# Patient Record
Sex: FEMALE | Race: WHITE | NOT HISPANIC OR LATINO
[De-identification: names, ages, dates, MRNs, and addresses within clinical notes are randomized per-mention and may not be internally consistent; named-entity substitution may affect disease eponyms.]

---

## 2018-05-21 ENCOUNTER — RESULT REVIEW (OUTPATIENT)
Age: 68
End: 2018-05-21

## 2019-05-21 ENCOUNTER — RESULT REVIEW (OUTPATIENT)
Age: 69
End: 2019-05-21

## 2021-04-28 ENCOUNTER — APPOINTMENT (OUTPATIENT)
Dept: PULMONOLOGY | Facility: CLINIC | Age: 71
End: 2021-04-28
Payer: COMMERCIAL

## 2021-05-05 ENCOUNTER — APPOINTMENT (OUTPATIENT)
Dept: PULMONOLOGY | Facility: CLINIC | Age: 71
End: 2021-05-05
Payer: COMMERCIAL

## 2021-05-05 VITALS
DIASTOLIC BLOOD PRESSURE: 86 MMHG | BODY MASS INDEX: 45.29 KG/M2 | HEART RATE: 76 BPM | WEIGHT: 243 LBS | SYSTOLIC BLOOD PRESSURE: 130 MMHG | HEIGHT: 61.25 IN | OXYGEN SATURATION: 99 %

## 2021-05-05 DIAGNOSIS — Z85.3 PERSONAL HISTORY OF MALIGNANT NEOPLASM OF BREAST: ICD-10-CM

## 2021-05-05 DIAGNOSIS — Z86.79 PERSONAL HISTORY OF OTHER DISEASES OF THE CIRCULATORY SYSTEM: ICD-10-CM

## 2021-05-05 DIAGNOSIS — Z87.891 PERSONAL HISTORY OF NICOTINE DEPENDENCE: ICD-10-CM

## 2021-05-05 DIAGNOSIS — Z92.21 PERSONAL HISTORY OF ANTINEOPLASTIC CHEMOTHERAPY: ICD-10-CM

## 2021-05-05 DIAGNOSIS — E11.9 TYPE 2 DIABETES MELLITUS W/OUT COMPLICATIONS: ICD-10-CM

## 2021-05-05 DIAGNOSIS — R60.0 LOCALIZED EDEMA: ICD-10-CM

## 2021-05-05 DIAGNOSIS — Z80.3 FAMILY HISTORY OF MALIGNANT NEOPLASM OF BREAST: ICD-10-CM

## 2021-05-05 DIAGNOSIS — Z92.3 PERSONAL HISTORY OF IRRADIATION: ICD-10-CM

## 2021-05-05 DIAGNOSIS — Z83.3 FAMILY HISTORY OF DIABETES MELLITUS: ICD-10-CM

## 2021-05-05 PROCEDURE — 99204 OFFICE O/P NEW MOD 45 MIN: CPT

## 2021-05-05 PROCEDURE — 99072 ADDL SUPL MATRL&STAF TM PHE: CPT

## 2021-05-05 RX ORDER — OXYCODONE HYDROCHLORIDE AND ACETAMINOPHEN 10; 325 MG/1; MG/1
10-325 TABLET ORAL
Refills: 0 | Status: ACTIVE | COMMUNITY

## 2021-05-05 RX ORDER — LETROZOLE TABLETS 2.5 MG/1
2.5 TABLET, FILM COATED ORAL
Refills: 0 | Status: ACTIVE | COMMUNITY

## 2021-05-05 RX ORDER — OLMESARTAN MEDOXOMIL 20 MG/1
20 TABLET, FILM COATED ORAL
Refills: 0 | Status: ACTIVE | COMMUNITY

## 2021-05-05 RX ORDER — METFORMIN HYDROCHLORIDE 500 MG/1
500 TABLET, COATED ORAL
Refills: 0 | Status: ACTIVE | COMMUNITY

## 2021-05-05 RX ORDER — LEVOTHYROXINE SODIUM 100 UG/1
100 TABLET ORAL
Refills: 0 | Status: ACTIVE | COMMUNITY

## 2021-05-05 RX ORDER — ALPRAZOLAM 0.25 MG/1
0.25 TABLET ORAL
Refills: 0 | Status: ACTIVE | COMMUNITY

## 2021-05-05 NOTE — REASON FOR VISIT
[Consultation] : a consultation [Edema] : edema [Shortness of Breath] : shortness of breath [Wheezing] : wheezing [Obesity] : obesity

## 2021-05-05 NOTE — REVIEW OF SYSTEMS
[Recent Wt Gain (___ Lbs)] : ~T recent [unfilled] lb weight gain [Dyspnea] : dyspnea [Wheezing] : wheezing [SOB on Exertion] : sob on exertion [Edema] : edema [Diabetes] : diabetes [Negative] : Psychiatric

## 2021-05-05 NOTE — CONSULT LETTER
[Dear  ___] : Dear  [unfilled], [Consult Letter:] : I had the pleasure of evaluating your patient, [unfilled]. [Please see my note below.] : Please see my note below. [Consult Closing:] : Thank you very much for allowing me to participate in the care of this patient.  If you have any questions, please do not hesitate to contact me. [Sincerely,] : Sincerely, [FreeTextEntry3] : Alcides Cao MD\par  [DrPetey  ___] : Dr. MCINTOSH

## 2021-05-05 NOTE — PHYSICAL EXAM
[No Acute Distress] : no acute distress [Normal Oropharynx] : normal oropharynx [Normal Appearance] : normal appearance [No Neck Mass] : no neck mass [Normal Rate/Rhythm] : normal rate/rhythm [Normal S1, S2] : normal s1, s2 [No Murmurs] : no murmurs [No Resp Distress] : no resp distress [Clear to Auscultation Bilaterally] : clear to auscultation bilaterally [No Abnormalities] : no abnormalities [Benign] : benign [Normal Gait] : normal gait [No Clubbing] : no clubbing [No Cyanosis] : no cyanosis [FROM] : FROM [Normal Color/ Pigmentation] : normal color/ pigmentation [No Focal Deficits] : no focal deficits [Oriented x3] : oriented x3 [Normal Affect] : normal affect [TextBox_105] : Trace edema

## 2021-05-06 ENCOUNTER — APPOINTMENT (OUTPATIENT)
Dept: RADIOLOGY | Facility: CLINIC | Age: 71
End: 2021-05-06
Payer: MEDICARE

## 2021-05-06 ENCOUNTER — OUTPATIENT (OUTPATIENT)
Dept: OUTPATIENT SERVICES | Facility: HOSPITAL | Age: 71
LOS: 1 days | End: 2021-05-06
Payer: MEDICARE

## 2021-05-06 DIAGNOSIS — Z98.89 OTHER SPECIFIED POSTPROCEDURAL STATES: Chronic | ICD-10-CM

## 2021-05-06 DIAGNOSIS — R06.00 DYSPNEA, UNSPECIFIED: ICD-10-CM

## 2021-05-06 DIAGNOSIS — Z90.49 ACQUIRED ABSENCE OF OTHER SPECIFIED PARTS OF DIGESTIVE TRACT: Chronic | ICD-10-CM

## 2021-05-06 DIAGNOSIS — Z98.84 BARIATRIC SURGERY STATUS: Chronic | ICD-10-CM

## 2021-05-06 PROCEDURE — 71046 X-RAY EXAM CHEST 2 VIEWS: CPT | Mod: 26

## 2021-05-06 PROCEDURE — 71046 X-RAY EXAM CHEST 2 VIEWS: CPT

## 2021-05-22 ENCOUNTER — APPOINTMENT (OUTPATIENT)
Dept: DISASTER EMERGENCY | Facility: CLINIC | Age: 71
End: 2021-05-22

## 2021-05-22 ENCOUNTER — LABORATORY RESULT (OUTPATIENT)
Age: 71
End: 2021-05-22

## 2021-05-25 ENCOUNTER — APPOINTMENT (OUTPATIENT)
Dept: PULMONOLOGY | Facility: CLINIC | Age: 71
End: 2021-05-25
Payer: MEDICARE

## 2021-05-25 VITALS
SYSTOLIC BLOOD PRESSURE: 130 MMHG | OXYGEN SATURATION: 98 % | WEIGHT: 244 LBS | HEIGHT: 61.25 IN | DIASTOLIC BLOOD PRESSURE: 80 MMHG | BODY MASS INDEX: 45.48 KG/M2 | TEMPERATURE: 97.7 F | HEART RATE: 74 BPM

## 2021-05-25 DIAGNOSIS — R06.00 DYSPNEA, UNSPECIFIED: ICD-10-CM

## 2021-05-25 DIAGNOSIS — E66.01 MORBID (SEVERE) OBESITY DUE TO EXCESS CALORIES: ICD-10-CM

## 2021-05-25 DIAGNOSIS — R06.02 SHORTNESS OF BREATH: ICD-10-CM

## 2021-05-25 PROCEDURE — 94727 GAS DIL/WSHOT DETER LNG VOL: CPT

## 2021-05-25 PROCEDURE — 99213 OFFICE O/P EST LOW 20 MIN: CPT | Mod: 25

## 2021-05-25 PROCEDURE — 85018 HEMOGLOBIN: CPT | Mod: QW

## 2021-05-25 PROCEDURE — 94729 DIFFUSING CAPACITY: CPT

## 2021-05-25 PROCEDURE — 94010 BREATHING CAPACITY TEST: CPT

## 2021-05-25 NOTE — CONSULT LETTER
[Dear  ___] : Dear  [unfilled], [Courtesy Letter:] : I had the pleasure of seeing your patient, [unfilled], in my office today. [Please see my note below.] : Please see my note below. [Consult Closing:] : Thank you very much for allowing me to participate in the care of this patient.  If you have any questions, please do not hesitate to contact me. [Sincerely,] : Sincerely, [FreeTextEntry3] : Alcides Cao MD\par

## 2021-05-25 NOTE — COUNSELING
[Potential consequences of obesity discussed] : Potential consequences of obesity discussed [Benefits of weight loss discussed] : Benefits of weight loss discussed [Structured Weight Management Program suggested:] : Structured weight management program suggested [Encouraged to maintain food diary] : Encouraged to maintain food diary [FreeTextEntry1] : Weight [employer]chers>

## 2022-09-01 ENCOUNTER — APPOINTMENT (OUTPATIENT)
Dept: ORTHOPEDIC SURGERY | Facility: CLINIC | Age: 72
End: 2022-09-01

## 2022-09-01 DIAGNOSIS — M18.12 UNILATERAL PRIMARY OSTEOARTHRITIS OF FIRST CARPOMETACARPAL JOINT, LEFT HAND: ICD-10-CM

## 2022-09-01 DIAGNOSIS — M65.4 RADIAL STYLOID TENOSYNOVITIS [DE QUERVAIN]: ICD-10-CM

## 2022-09-01 PROCEDURE — 73110 X-RAY EXAM OF WRIST: CPT | Mod: LT

## 2022-09-01 PROCEDURE — 99203 OFFICE O/P NEW LOW 30 MIN: CPT | Mod: 25

## 2022-09-01 PROCEDURE — 20605 DRAIN/INJ JOINT/BURSA W/O US: CPT

## 2022-09-01 NOTE — HISTORY OF PRESENT ILLNESS
[Sudden] : sudden [5] : 5 [Constant] : constant [Household chores] : household chores [Leisure] : leisure [Rest] : rest [Retired] : Work status: retired [de-identified] : 72 year old female presenting with radial sided wrist and thumb pain. No injury/trauma.  [] : no [FreeTextEntry3] : 08/2022 [FreeTextEntry5] : n/a [FreeTextEntry6] : pressure

## 2022-09-01 NOTE — PHYSICAL EXAM
[First Dorsal Compartment] : first dorsal compartment [1st] : 1st [CMC Joint] : CMC joint [Left] : left wrist [Degenerative change] : Degenerative change [] : no erythema [FreeTextEntry8] : stage 3 first CMC arthritis

## 2022-10-03 ENCOUNTER — APPOINTMENT (OUTPATIENT)
Dept: ORTHOPEDIC SURGERY | Facility: CLINIC | Age: 72
End: 2022-10-03

## 2023-03-02 ENCOUNTER — OFFICE (OUTPATIENT)
Dept: URBAN - METROPOLITAN AREA CLINIC 115 | Facility: CLINIC | Age: 73
Setting detail: OPHTHALMOLOGY
End: 2023-03-02
Payer: MEDICARE

## 2023-03-02 DIAGNOSIS — E11.9: ICD-10-CM

## 2023-03-02 DIAGNOSIS — H35.40: ICD-10-CM

## 2023-03-02 DIAGNOSIS — H43.813: ICD-10-CM

## 2023-03-02 DIAGNOSIS — H35.033: ICD-10-CM

## 2023-03-02 DIAGNOSIS — H25.13: ICD-10-CM

## 2023-03-02 DIAGNOSIS — H40.003: ICD-10-CM

## 2023-03-02 PROCEDURE — 92083 EXTENDED VISUAL FIELD XM: CPT | Performed by: OPTOMETRIST

## 2023-03-02 PROCEDURE — 92014 COMPRE OPH EXAM EST PT 1/>: CPT | Performed by: OPTOMETRIST

## 2023-03-02 PROCEDURE — 92133 CPTRZD OPH DX IMG PST SGM ON: CPT | Performed by: OPTOMETRIST

## 2023-03-02 ASSESSMENT — REFRACTION_MANIFEST
OD_VA1: 20/30
OS_VA1: 20/30
OS_CYLINDER: -0.50
OD_SPHERE: -5.75
OD_ADD: +3.00
OS_SPHERE: -5.25
OS_ADD: +3.00
OS_AXIS: 120

## 2023-03-02 ASSESSMENT — REFRACTION_CURRENTRX
OD_VPRISM_DIRECTION: PROGS
OD_CYLINDER: -1.00
OS_VPRISM_DIRECTION: PROGS
OD_AXIS: 128
OS_SPHERE: -5.75
OD_ADD: +2.50
OD_OVR_VA: 20/
OS_OVR_VA: 20/
OS_CYLINDER: -0.75
OD_ADD: +2.50
OS_CYLINDER: SPH
OS_OVR_VA: 20/
OD_VPRISM_DIRECTION: PROGS
OS_OVR_VA: 20/
OD_VPRISM_DIRECTION: PROGS
OD_SPHERE: -5.75
OS_SPHERE: -5.75
OS_VPRISM_DIRECTION: PROGS
OS_CYLINDER: -0.75
OD_AXIS: 118
OD_ADD: +2.75
OD_OVR_VA: 20/
OD_SPHERE: -5.50
OD_CYLINDER: -0.25
OS_ADD: +2.50
OD_SPHERE: -5.50
OD_OVR_VA: 20/
OS_SPHERE: -5.50
OS_ADD: +2.75
OS_AXIS: 089
OS_ADD: +2.50
OS_VPRISM_DIRECTION: PROGS
OD_AXIS: 116
OS_AXIS: 093
OD_CYLINDER: -1.00

## 2023-03-02 ASSESSMENT — REFRACTION_AUTOREFRACTION
OS_AXIS: 085
OS_CYLINDER: -0.75
OD_SPHERE: -6.00
OD_CYLINDER: -1.00
OS_SPHERE: -5.75
OD_AXIS: 137

## 2023-03-02 ASSESSMENT — PACHYMETRY
OD_CT_CORRECTION: -1
OS_CT_CORRECTION: -1
OS_CT_UM: 560
OD_CT_UM: 560

## 2023-03-02 ASSESSMENT — SPHEQUIV_DERIVED
OS_SPHEQUIV: -5.5
OD_SPHEQUIV: -6.5
OS_SPHEQUIV: -6.125

## 2023-03-02 ASSESSMENT — VISUAL ACUITY
OS_BCVA: 20/50
OD_BCVA: 20/60

## 2023-03-02 ASSESSMENT — CONFRONTATIONAL VISUAL FIELD TEST (CVF)
OS_FINDINGS: FULL
OD_FINDINGS: FULL

## 2023-04-11 ENCOUNTER — OFFICE (OUTPATIENT)
Dept: URBAN - METROPOLITAN AREA CLINIC 115 | Facility: CLINIC | Age: 73
Setting detail: OPHTHALMOLOGY
End: 2023-04-11
Payer: MEDICARE

## 2023-04-11 DIAGNOSIS — H40.003: ICD-10-CM

## 2023-04-11 DIAGNOSIS — H25.12: ICD-10-CM

## 2023-04-11 DIAGNOSIS — H43.813: ICD-10-CM

## 2023-04-11 DIAGNOSIS — H25.13: ICD-10-CM

## 2023-04-11 DIAGNOSIS — E11.9: ICD-10-CM

## 2023-04-11 DIAGNOSIS — H35.40: ICD-10-CM

## 2023-04-11 DIAGNOSIS — H35.033: ICD-10-CM

## 2023-04-11 PROBLEM — H25.11 CATARACT SENILE NUCLEAR SCLEROSIS; RIGHT EYE, LEFT EYE, BOTH EYES: Status: ACTIVE | Noted: 2023-04-11

## 2023-04-11 PROCEDURE — 99214 OFFICE O/P EST MOD 30 MIN: CPT | Performed by: OPHTHALMOLOGY

## 2023-04-11 PROCEDURE — 92136 OPHTHALMIC BIOMETRY: CPT | Performed by: OPHTHALMOLOGY

## 2023-04-11 ASSESSMENT — REFRACTION_CURRENTRX
OD_VPRISM_DIRECTION: PROGS
OS_ADD: +2.50
OD_ADD: +2.50
OD_OVR_VA: 20/
OS_AXIS: 098
OS_ADD: +2.50
OD_VPRISM_DIRECTION: PROGS
OS_OVR_VA: 20/
OS_VPRISM_DIRECTION: PROGS
OS_OVR_VA: 20/
OS_CYLINDER: -0.75
OS_OVR_VA: 20/
OD_CYLINDER: -0.25
OD_CYLINDER: -1.00
OD_AXIS: 128
OD_SPHERE: -5.50
OD_ADD: +2.75
OD_SPHERE: -5.75
OS_SPHERE: -5.75
OS_ADD: +2.75
OD_SPHERE: -5.50
OD_ADD: +2.50
OD_CYLINDER: -1.25
OD_AXIS: 118
OD_CYLINDER: -1.00
OS_VPRISM_DIRECTION: PROGS
OD_AXIS: 122
OD_VPRISM_DIRECTION: BF
OS_AXIS: 089
OS_CYLINDER: SPH
OS_SPHERE: -5.50
OD_VPRISM_DIRECTION: PROGS
OS_ADD: +3.00
OD_OVR_VA: 20/
OS_SPHERE: -5.75
OS_CYLINDER: -0.75
OS_VPRISM_DIRECTION: BF
OD_OVR_VA: 20/
OD_SPHERE: -5.75
OD_ADD: +3.00
OD_AXIS: 116
OS_VPRISM_DIRECTION: PROGS
OS_SPHERE: -5.75
OS_AXIS: 093
OS_CYLINDER: -0.75

## 2023-04-11 ASSESSMENT — SPHEQUIV_DERIVED
OS_SPHEQUIV: -5.75
OS_SPHEQUIV: -5.5
OD_SPHEQUIV: -6.625

## 2023-04-11 ASSESSMENT — REFRACTION_MANIFEST
OD_ADD: +3.00
OS_SPHERE: -5.25
OD_SPHERE: -5.75
OS_ADD: +3.00
OU_VA: 20/NI
OS_CYLINDER: -0.50
OD_VA1: 20/NI
OS_AXIS: 120
OS_VA1: 20/NI

## 2023-04-11 ASSESSMENT — REFRACTION_AUTOREFRACTION
OS_CYLINDER: -1.00
OD_AXIS: 129
OD_SPHERE: -6.25
OS_SPHERE: -5.25
OS_AXIS: 083
OD_CYLINDER: -0.75

## 2023-04-11 ASSESSMENT — VISUAL ACUITY
OS_BCVA: 20/30-1
OD_BCVA: 20/40

## 2023-04-11 ASSESSMENT — PACHYMETRY
OD_CT_UM: 560
OS_CT_CORRECTION: -1
OD_CT_CORRECTION: -1
OS_CT_UM: 560

## 2023-04-11 ASSESSMENT — TONOMETRY
OS_IOP_MMHG: 16
OD_IOP_MMHG: 16

## 2023-04-11 ASSESSMENT — CONFRONTATIONAL VISUAL FIELD TEST (CVF)
OD_FINDINGS: FULL
OS_FINDINGS: FULL

## 2023-06-08 ENCOUNTER — ASC (OUTPATIENT)
Dept: URBAN - METROPOLITAN AREA SURGERY 8 | Facility: SURGERY | Age: 73
Setting detail: OPHTHALMOLOGY
End: 2023-06-08
Payer: MEDICARE

## 2023-06-08 DIAGNOSIS — H25.12: ICD-10-CM

## 2023-06-08 PROCEDURE — 66984 XCAPSL CTRC RMVL W/O ECP: CPT | Performed by: OPHTHALMOLOGY

## 2023-06-09 ENCOUNTER — RX ONLY (RX ONLY)
Age: 73
End: 2023-06-09

## 2023-06-09 ENCOUNTER — OFFICE (OUTPATIENT)
Dept: URBAN - METROPOLITAN AREA CLINIC 115 | Facility: CLINIC | Age: 73
Setting detail: OPHTHALMOLOGY
End: 2023-06-09
Payer: MEDICARE

## 2023-06-09 DIAGNOSIS — H25.11: ICD-10-CM

## 2023-06-09 DIAGNOSIS — Z96.1: ICD-10-CM

## 2023-06-09 PROCEDURE — 99024 POSTOP FOLLOW-UP VISIT: CPT | Performed by: OPHTHALMOLOGY

## 2023-06-09 PROCEDURE — 92136 OPHTHALMIC BIOMETRY: CPT | Performed by: OPHTHALMOLOGY

## 2023-06-09 ASSESSMENT — REFRACTION_AUTOREFRACTION
OD_CYLINDER: -1.00
OS_CYLINDER: -1.25
OS_SPHERE: -0.75
OD_AXIS: 113
OD_SPHERE: -5.25
OS_AXIS: 084

## 2023-06-09 ASSESSMENT — REFRACTION_MANIFEST
OS_SPHERE: -5.25
OD_VA1: 20/NI
OS_AXIS: 120
OS_VA1: 20/NI
OU_VA: 20/NI
OD_SPHERE: -5.75
OS_ADD: +3.00
OS_CYLINDER: -0.50
OD_ADD: +3.00

## 2023-06-09 ASSESSMENT — REFRACTION_CURRENTRX
OS_SPHERE: -5.75
OS_OVR_VA: 20/
OS_VPRISM_DIRECTION: PROGS
OD_AXIS: 118
OD_ADD: +3.00
OD_CYLINDER: -0.25
OD_AXIS: 122
OD_ADD: +2.50
OD_SPHERE: -5.50
OS_CYLINDER: -0.75
OD_VPRISM_DIRECTION: PROGS
OS_ADD: +2.75
OS_AXIS: 093
OS_OVR_VA: 20/
OD_VPRISM_DIRECTION: PROGS
OS_AXIS: 089
OD_ADD: +2.75
OS_ADD: +2.50
OS_CYLINDER: -0.75
OD_SPHERE: -5.75
OS_OVR_VA: 20/
OS_SPHERE: -5.50
OS_ADD: +2.50
OD_CYLINDER: -1.00
OS_SPHERE: -5.75
OD_CYLINDER: -1.25
OD_AXIS: 128
OD_OVR_VA: 20/
OD_VPRISM_DIRECTION: BF
OS_VPRISM_DIRECTION: PROGS
OS_CYLINDER: SPH
OS_VPRISM_DIRECTION: PROGS
OS_AXIS: 098
OD_VPRISM_DIRECTION: PROGS
OS_SPHERE: -5.75
OD_OVR_VA: 20/
OD_OVR_VA: 20/
OS_CYLINDER: -0.75
OD_AXIS: 116
OD_CYLINDER: -1.00
OS_ADD: +3.00
OD_SPHERE: -5.50
OS_VPRISM_DIRECTION: BF
OD_SPHERE: -5.75
OD_ADD: +2.50

## 2023-06-09 ASSESSMENT — SPHEQUIV_DERIVED
OD_SPHEQUIV: -5.75
OS_SPHEQUIV: -5.5
OS_SPHEQUIV: -1.375

## 2023-06-09 ASSESSMENT — VISUAL ACUITY
OD_BCVA: 20/80
OS_BCVA: 20/40

## 2023-06-09 ASSESSMENT — PACHYMETRY
OD_CT_UM: 560
OS_CT_UM: 560
OS_CT_CORRECTION: -1
OD_CT_CORRECTION: -1

## 2023-06-09 ASSESSMENT — CONFRONTATIONAL VISUAL FIELD TEST (CVF)
OS_FINDINGS: FULL
OD_FINDINGS: FULL

## 2023-06-09 ASSESSMENT — TONOMETRY: OD_IOP_MMHG: 19

## 2023-06-09 ASSESSMENT — CORNEAL EDEMA CLINICAL DESCRIPTION: OS_CORNEALEDEMA: T

## 2023-06-09 ASSESSMENT — CORNEAL EDEMA - FOLDS/STRIAE: OS_FOLDSSTRIAE: T 1+

## 2023-06-22 ENCOUNTER — ASC (OUTPATIENT)
Dept: URBAN - METROPOLITAN AREA SURGERY 8 | Facility: SURGERY | Age: 73
Setting detail: OPHTHALMOLOGY
End: 2023-06-22
Payer: MEDICARE

## 2023-06-22 DIAGNOSIS — H25.11: ICD-10-CM

## 2023-06-22 PROCEDURE — 66984 XCAPSL CTRC RMVL W/O ECP: CPT | Performed by: OPHTHALMOLOGY

## 2023-06-23 ENCOUNTER — OFFICE (OUTPATIENT)
Dept: URBAN - METROPOLITAN AREA CLINIC 115 | Facility: CLINIC | Age: 73
Setting detail: OPHTHALMOLOGY
End: 2023-06-23
Payer: MEDICARE

## 2023-06-23 ENCOUNTER — RX ONLY (RX ONLY)
Age: 73
End: 2023-06-23

## 2023-06-23 DIAGNOSIS — Z96.1: ICD-10-CM

## 2023-06-23 PROCEDURE — 99024 POSTOP FOLLOW-UP VISIT: CPT | Performed by: OPTOMETRIST

## 2023-06-23 ASSESSMENT — PACHYMETRY
OD_CT_UM: 560
OS_CT_CORRECTION: -1
OD_CT_CORRECTION: -1
OS_CT_UM: 560

## 2023-06-23 ASSESSMENT — CORNEAL EDEMA - FOLDS/STRIAE
OD_FOLDSSTRIAE: T
OS_FOLDSSTRIAE: T

## 2023-06-23 ASSESSMENT — CORNEAL EDEMA CLINICAL DESCRIPTION: OS_CORNEALEDEMA: ABSENT

## 2023-06-29 ENCOUNTER — OFFICE (OUTPATIENT)
Dept: URBAN - METROPOLITAN AREA CLINIC 115 | Facility: CLINIC | Age: 73
Setting detail: OPHTHALMOLOGY
End: 2023-06-29
Payer: MEDICARE

## 2023-06-29 DIAGNOSIS — Z96.1: ICD-10-CM

## 2023-06-29 PROBLEM — H25.11 CATARACT SENILE NUCLEAR SCLEROSIS; RIGHT EYE,: Status: RESOLVED | Noted: 2023-06-09 | Resolved: 2023-06-29

## 2023-06-29 PROCEDURE — 99024 POSTOP FOLLOW-UP VISIT: CPT | Performed by: OPTOMETRIST

## 2023-06-29 ASSESSMENT — REFRACTION_CURRENTRX
OD_CYLINDER: -1.25
OS_ADD: +2.75
OS_VPRISM_DIRECTION: PROGS
OD_SPHERE: -5.75
OD_VPRISM_DIRECTION: PROGS
OD_AXIS: 118
OS_VPRISM_DIRECTION: BF
OS_AXIS: 089
OD_CYLINDER: -1.00
OS_CYLINDER: -0.75
OD_AXIS: 118
OS_AXIS: 093
OD_OVR_VA: 20/
OS_OVR_VA: 20/
OD_OVR_VA: 20/
OD_ADD: +3.00
OD_VPRISM_DIRECTION: PROGS
OS_OVR_VA: 20/
OS_ADD: +2.50
OD_AXIS: 128
OD_AXIS: 116
OS_VPRISM_DIRECTION: PROGS
OS_OVR_VA: 20/
OS_AXIS: 089
OS_VPRISM_DIRECTION: PROGS
OS_ADD: +3.00
OS_ADD: +2.50
OS_CYLINDER: SPH
OD_ADD: +2.50
OD_SPHERE: -5.75
OD_OVR_VA: 20/
OS_CYLINDER: -0.75
OD_AXIS: 116
OD_ADD: +2.50
OD_OVR_VA: 20/
OD_VPRISM_DIRECTION: BF
OS_VPRISM_DIRECTION: PROGS
OS_CYLINDER: -0.75
OD_ADD: +2.50
OD_VPRISM_DIRECTION: PROGS
OS_VPRISM_DIRECTION: PROGS
OD_AXIS: 128
OS_CYLINDER: -0.75
OS_SPHERE: -5.75
OS_CYLINDER: -0.75
OD_ADD: +3.00
OS_ADD: +2.75
OD_AXIS: 122
OS_SPHERE: -5.75
OD_SPHERE: -5.50
OD_ADD: +2.75
OD_SPHERE: -5.50
OD_CYLINDER: -1.00
OD_ADD: +2.50
OD_SPHERE: -5.75
OD_CYLINDER: -1.00
OS_AXIS: 098
OS_SPHERE: -5.50
OS_ADD: +2.50
OS_ADD: +2.50
OS_ADD: +3.00
OD_SPHERE: -5.50
OS_OVR_VA: 20/
OD_VPRISM_DIRECTION: PROGS
OS_CYLINDER: SPH
OS_AXIS: 098
OD_VPRISM_DIRECTION: BF
OD_ADD: +2.75
OS_SPHERE: -5.75
OD_CYLINDER: -0.25
OD_VPRISM_DIRECTION: PROGS
OS_SPHERE: -5.50
OD_VPRISM_DIRECTION: PROGS
OD_OVR_VA: 20/
OD_CYLINDER: -0.25
OD_AXIS: 122
OD_SPHERE: -5.75
OS_OVR_VA: 20/
OS_OVR_VA: 20/
OS_SPHERE: -5.75
OS_VPRISM_DIRECTION: BF
OS_AXIS: 093
OS_SPHERE: -5.75
OD_SPHERE: -5.50
OD_CYLINDER: -1.00
OS_VPRISM_DIRECTION: PROGS
OS_CYLINDER: -0.75
OD_CYLINDER: -1.25
OS_SPHERE: -5.75
OD_OVR_VA: 20/

## 2023-06-29 ASSESSMENT — REFRACTION_MANIFEST
OS_SPHERE: -5.25
OD_SPHERE: -5.75
OS_SPHERE: -5.25
OS_ADD: +3.00
OU_VA: 20/NI
OD_ADD: +3.00
OS_AXIS: 120
OU_VA: 20/NI
OS_VA1: 20/NI
OS_CYLINDER: -0.50
OS_CYLINDER: -0.50
OD_VA1: 20/NI
OD_ADD: +3.00
OD_SPHERE: -5.75
OS_AXIS: 120
OS_VA1: 20/NI
OS_ADD: +3.00
OD_VA1: 20/NI

## 2023-06-29 ASSESSMENT — REFRACTION_AUTOREFRACTION
OS_SPHERE: +0.25
OD_AXIS: 144
OD_AXIS: 144
OD_SPHERE: +0.25
OS_CYLINDER: -0.75
OS_AXIS: 070
OS_SPHERE: +0.25
OD_CYLINDER: -0.75
OD_SPHERE: +0.25
OS_CYLINDER: -0.75
OS_AXIS: 070
OD_CYLINDER: -0.75

## 2023-06-29 ASSESSMENT — PACHYMETRY
OD_CT_UM: 560
OD_CT_CORRECTION: -1
OS_CT_CORRECTION: -1
OS_CT_UM: 560

## 2023-06-29 ASSESSMENT — VISUAL ACUITY
OS_BCVA: 20/30-2
OS_BCVA: 20/40-1
OD_BCVA: 20/25
OD_BCVA: 20/25

## 2023-06-29 ASSESSMENT — SPHEQUIV_DERIVED
OS_SPHEQUIV: -0.125
OS_SPHEQUIV: -5.5
OD_SPHEQUIV: -0.125
OD_SPHEQUIV: -0.125
OS_SPHEQUIV: -5.5
OS_SPHEQUIV: -0.125

## 2023-06-29 ASSESSMENT — CORNEAL EDEMA - FOLDS/STRIAE: OD_FOLDSSTRIAE: ABSENT

## 2023-07-21 ENCOUNTER — OFFICE (OUTPATIENT)
Dept: URBAN - METROPOLITAN AREA CLINIC 115 | Facility: CLINIC | Age: 73
Setting detail: OPHTHALMOLOGY
End: 2023-07-21
Payer: MEDICARE

## 2023-07-21 DIAGNOSIS — Z96.1: ICD-10-CM

## 2023-07-21 PROCEDURE — 99024 POSTOP FOLLOW-UP VISIT: CPT | Performed by: OPTOMETRIST

## 2023-07-21 ASSESSMENT — REFRACTION_CURRENTRX
OS_OVR_VA: 20/
OS_AXIS: 093
OD_AXIS: 118
OD_OVR_VA: 20/
OD_SPHERE: -5.75
OD_ADD: +2.50
OS_VPRISM_DIRECTION: BF
OS_ADD: +3.00
OS_SPHERE: -5.75
OS_VPRISM_DIRECTION: PROGS
OD_SPHERE: -5.50
OS_AXIS: 098
OS_OVR_VA: 20/
OD_SPHERE: -5.50
OS_CYLINDER: -0.75
OS_ADD: +2.75
OD_CYLINDER: -0.25
OS_AXIS: 089
OD_SPHERE: -5.75
OS_CYLINDER: SPH
OD_ADD: +2.50
OD_AXIS: 128
OD_VPRISM_DIRECTION: PROGS
OD_CYLINDER: -1.00
OS_ADD: +2.50
OD_OVR_VA: 20/
OS_SPHERE: -5.75
OD_AXIS: 116
OD_OVR_VA: 20/
OS_SPHERE: -5.50
OS_CYLINDER: -0.75
OD_VPRISM_DIRECTION: BF
OD_ADD: +3.00
OD_CYLINDER: -1.00
OD_AXIS: 122
OS_VPRISM_DIRECTION: PROGS
OD_CYLINDER: -1.25
OS_VPRISM_DIRECTION: PROGS
OS_CYLINDER: -0.75
OD_VPRISM_DIRECTION: PROGS
OS_SPHERE: -5.75
OD_VPRISM_DIRECTION: PROGS
OD_ADD: +2.75
OS_ADD: +2.50
OS_OVR_VA: 20/

## 2023-07-21 ASSESSMENT — REFRACTION_MANIFEST
OU_VA: 20/20
OD_AXIS: 122
OD_VA1: 20/20
OS_ADD: +3.00
OD_SPHERE: -5.75
OD_SPHERE: +0.25
OS_VA1: 20/NI
OS_SPHERE: -5.25
OS_CYLINDER: -0.50
OS_SPHERE: +0.25
OD_CYLINDER: -0.50
OS_VA1: 20/20
OS_AXIS: 074
OS_AXIS: 120
OD_ADD: +3.00
OD_VA1: 20/NI
OS_CYLINDER: -0.75
OU_VA: 20/NI

## 2023-07-21 ASSESSMENT — SPHEQUIV_DERIVED
OD_SPHEQUIV: 0.25
OS_SPHEQUIV: -0.125
OD_SPHEQUIV: 0
OS_SPHEQUIV: -0.125
OS_SPHEQUIV: -5.5

## 2023-07-21 ASSESSMENT — REFRACTION_AUTOREFRACTION
OS_AXIS: 074
OD_AXIS: 122
OD_SPHERE: +0.75
OD_CYLINDER: -1.00
OS_CYLINDER: -1.25
OS_SPHERE: +0.50

## 2023-07-21 ASSESSMENT — PACHYMETRY
OD_CT_UM: 560
OS_CT_CORRECTION: -1
OD_CT_CORRECTION: -1
OS_CT_UM: 560

## 2023-07-21 ASSESSMENT — VISUAL ACUITY
OS_BCVA: 20/20-
OD_BCVA: 20/20-1

## 2023-07-21 ASSESSMENT — CORNEAL EDEMA - FOLDS/STRIAE: OD_FOLDSSTRIAE: ABSENT

## 2023-07-21 ASSESSMENT — TONOMETRY
OS_IOP_MMHG: 14
OD_IOP_MMHG: 13

## 2023-10-01 PROBLEM — Z92.3 HISTORY OF RADIATION THERAPY: Status: RESOLVED | Noted: 2021-05-05 | Resolved: 2023-10-01

## 2023-11-01 ENCOUNTER — OFFICE (OUTPATIENT)
Dept: URBAN - METROPOLITAN AREA CLINIC 115 | Facility: CLINIC | Age: 73
Setting detail: OPHTHALMOLOGY
End: 2023-11-01
Payer: MEDICARE

## 2023-11-01 DIAGNOSIS — H43.813: ICD-10-CM

## 2023-11-01 DIAGNOSIS — H35.40: ICD-10-CM

## 2023-11-01 DIAGNOSIS — H35.033: ICD-10-CM

## 2023-11-01 DIAGNOSIS — Z96.1: ICD-10-CM

## 2023-11-01 DIAGNOSIS — E11.9: ICD-10-CM

## 2023-11-01 PROCEDURE — 92014 COMPRE OPH EXAM EST PT 1/>: CPT | Performed by: OPTOMETRIST

## 2023-11-01 PROCEDURE — 92250 FUNDUS PHOTOGRAPHY W/I&R: CPT | Performed by: OPTOMETRIST

## 2023-11-01 ASSESSMENT — REFRACTION_AUTOREFRACTION
OS_CYLINDER: -1.75
OD_SPHERE: +1.00
OD_AXIS: 108
OS_AXIS: 076
OD_CYLINDER: -1.50
OS_SPHERE: +1.00

## 2023-11-01 ASSESSMENT — REFRACTION_MANIFEST
OD_CYLINDER: -0.50
OD_VA1: 20/NI
OS_CYLINDER: -0.75
OS_VA1: 20/NI
OU_VA: 20/20
OD_VA1: 20/20
OS_SPHERE: -5.25
OS_AXIS: 120
OD_AXIS: 122
OS_VA1: 20/20
OD_SPHERE: -5.75
OS_CYLINDER: -0.50
OD_ADD: +3.00
OS_AXIS: 074
OD_SPHERE: +0.25
OS_SPHERE: +0.25
OS_ADD: +3.00
OU_VA: 20/NI

## 2023-11-01 ASSESSMENT — REFRACTION_CURRENTRX
OD_OVR_VA: 20/
OS_ADD: +3.00
OS_VPRISM_DIRECTION: PROGS
OS_SPHERE: -5.75
OS_CYLINDER: -0.75
OS_SPHERE: -5.75
OD_CYLINDER: -1.00
OD_ADD: +3.00
OS_OVR_VA: 20/
OS_ADD: +2.50
OD_VPRISM_DIRECTION: PROGS
OS_VPRISM_DIRECTION: PROGS
OD_ADD: +2.50
OD_VPRISM_DIRECTION: BF
OS_SPHERE: -5.75
OD_AXIS: 122
OD_VPRISM_DIRECTION: PROGS
OS_ADD: +2.75
OS_OVR_VA: 20/
OD_CYLINDER: -0.25
OD_SPHERE: -5.50
OD_ADD: +2.75
OS_VPRISM_DIRECTION: BF
OS_AXIS: 089
OD_AXIS: 116
OD_AXIS: 118
OS_OVR_VA: 20/
OD_SPHERE: -5.75
OD_AXIS: 128
OD_CYLINDER: -1.00
OD_OVR_VA: 20/
OS_SPHERE: -5.50
OS_ADD: +2.50
OD_CYLINDER: -1.25
OD_ADD: +2.50
OD_SPHERE: -5.50
OS_CYLINDER: -0.75
OS_CYLINDER: SPH
OS_CYLINDER: -0.75
OD_OVR_VA: 20/
OS_AXIS: 098
OS_AXIS: 093
OD_SPHERE: -5.75
OS_VPRISM_DIRECTION: PROGS
OD_VPRISM_DIRECTION: PROGS

## 2023-11-01 ASSESSMENT — SPHEQUIV_DERIVED
OD_SPHEQUIV: 0.25
OD_SPHEQUIV: 0
OS_SPHEQUIV: 0.125
OS_SPHEQUIV: -0.125
OS_SPHEQUIV: -5.5

## 2023-11-01 ASSESSMENT — CONFRONTATIONAL VISUAL FIELD TEST (CVF)
OD_FINDINGS: FULL
OS_FINDINGS: FULL

## 2023-11-01 ASSESSMENT — CORNEAL EDEMA - FOLDS/STRIAE: OD_FOLDSSTRIAE: ABSENT

## 2024-05-17 ENCOUNTER — OFFICE (OUTPATIENT)
Dept: URBAN - METROPOLITAN AREA CLINIC 115 | Facility: CLINIC | Age: 74
Setting detail: OPHTHALMOLOGY
End: 2024-05-17
Payer: MEDICARE

## 2024-05-17 DIAGNOSIS — H35.033: ICD-10-CM

## 2024-05-17 DIAGNOSIS — H43.813: ICD-10-CM

## 2024-05-17 PROCEDURE — 92014 COMPRE OPH EXAM EST PT 1/>: CPT | Performed by: OPTOMETRIST

## 2024-05-17 PROCEDURE — 92250 FUNDUS PHOTOGRAPHY W/I&R: CPT | Performed by: OPTOMETRIST

## 2024-05-17 ASSESSMENT — CONFRONTATIONAL VISUAL FIELD TEST (CVF)
OS_FINDINGS: FULL
OD_FINDINGS: FULL

## 2024-11-20 ENCOUNTER — OFFICE (OUTPATIENT)
Dept: URBAN - METROPOLITAN AREA CLINIC 115 | Facility: CLINIC | Age: 74
Setting detail: OPHTHALMOLOGY
End: 2024-11-20
Payer: MEDICARE

## 2024-11-20 DIAGNOSIS — H35.033: ICD-10-CM

## 2024-11-20 PROCEDURE — 92250 FUNDUS PHOTOGRAPHY W/I&R: CPT | Performed by: OPTOMETRIST

## 2024-11-20 PROCEDURE — 92014 COMPRE OPH EXAM EST PT 1/>: CPT | Performed by: OPTOMETRIST

## 2024-11-20 ASSESSMENT — REFRACTION_CURRENTRX
OS_AXIS: 089
OS_ADD: +2.75
OD_VPRISM_DIRECTION: PROGS
OS_VPRISM_DIRECTION: PROGS
OD_CYLINDER: -1.00
OD_AXIS: 116
OD_ADD: +2.50
OD_CYLINDER: -1.25
OD_ADD: +2.75
OS_OVR_VA: 20/
OS_VPRISM_DIRECTION: PROGS
OD_VPRISM_DIRECTION: BF
OD_AXIS: 128
OS_SPHERE: -5.75
OS_ADD: +2.50
OS_ADD: +2.50
OD_ADD: +2.50
OD_CYLINDER: -1.00
OS_SPHERE: -5.50
OD_SPHERE: -5.50
OS_SPHERE: -5.75
OS_CYLINDER: SPH
OD_SPHERE: -5.75
OS_CYLINDER: -0.75
OD_OVR_VA: 20/
OD_OVR_VA: 20/
OS_SPHERE: -5.75
OS_CYLINDER: -0.75
OS_OVR_VA: 20/
OD_SPHERE: -5.50
OD_AXIS: 118
OD_OVR_VA: 20/
OD_VPRISM_DIRECTION: PROGS
OD_CYLINDER: -0.25
OS_OVR_VA: 20/
OS_AXIS: 098
OS_VPRISM_DIRECTION: BF
OD_AXIS: 122
OS_CYLINDER: -0.75
OD_ADD: +3.00
OD_VPRISM_DIRECTION: PROGS
OS_AXIS: 093
OS_ADD: +3.00
OD_SPHERE: -5.75
OS_VPRISM_DIRECTION: PROGS

## 2024-11-20 ASSESSMENT — REFRACTION_MANIFEST
OD_SPHERE: -5.75
OS_SPHERE: -5.25
OD_ADD: +3.00
OD_CYLINDER: -0.50
OD_VA1: 20/20
OS_ADD: +3.00
OD_AXIS: 122
OU_VA: 20/20
OS_AXIS: 120
OU_VA: 20/NI
OS_SPHERE: +0.25
OS_VA1: 20/NI
OS_CYLINDER: -0.75
OD_VA1: 20/NI
OS_VA1: 20/20
OS_AXIS: 074
OD_SPHERE: +0.25
OS_CYLINDER: -0.50

## 2024-11-20 ASSESSMENT — PACHYMETRY
OS_CT_CORRECTION: -1
OS_CT_UM: 560
OD_CT_CORRECTION: -1
OD_CT_UM: 560

## 2024-11-20 ASSESSMENT — REFRACTION_AUTOREFRACTION
OS_AXIS: -1.5
OS_CYLINDER: +1.00
OD_AXIS: 103
OD_CYLINDER: -2.00
OD_SPHERE: +0.75
OS_SPHERE: +0.50

## 2024-11-20 ASSESSMENT — CONFRONTATIONAL VISUAL FIELD TEST (CVF)
OD_FINDINGS: FULL
OS_FINDINGS: FULL

## 2024-11-20 ASSESSMENT — VISUAL ACUITY
OS_BCVA: 20/25
OD_BCVA: 20/25-

## 2024-11-20 ASSESSMENT — CORNEAL EDEMA - FOLDS/STRIAE: OD_FOLDSSTRIAE: ABSENT

## 2025-05-22 ENCOUNTER — OFFICE (OUTPATIENT)
Dept: URBAN - METROPOLITAN AREA CLINIC 115 | Facility: CLINIC | Age: 75
Setting detail: OPHTHALMOLOGY
End: 2025-05-22
Payer: MEDICARE

## 2025-05-22 DIAGNOSIS — E11.9: ICD-10-CM

## 2025-05-22 DIAGNOSIS — H35.40: ICD-10-CM

## 2025-05-22 DIAGNOSIS — H35.033: ICD-10-CM

## 2025-05-22 DIAGNOSIS — Z96.1: ICD-10-CM

## 2025-05-22 DIAGNOSIS — H26.493: ICD-10-CM

## 2025-05-22 PROCEDURE — 92250 FUNDUS PHOTOGRAPHY W/I&R: CPT | Performed by: OPTOMETRIST

## 2025-05-22 PROCEDURE — 92012 INTRM OPH EXAM EST PATIENT: CPT | Performed by: OPTOMETRIST

## 2025-05-22 ASSESSMENT — REFRACTION_CURRENTRX
OS_ADD: +2.50
OS_VPRISM_DIRECTION: PROGS
OS_SPHERE: -5.75
OS_OVR_VA: 20/
OS_SPHERE: -5.50
OD_AXIS: 122
OS_ADD: +2.75
OD_VPRISM_DIRECTION: PROGS
OD_SPHERE: -5.75
OS_ADD: +2.50
OS_AXIS: 093
OD_VPRISM_DIRECTION: BF
OD_SPHERE: -5.50
OD_ADD: +3.00
OS_VPRISM_DIRECTION: PROGS
OS_CYLINDER: -0.75
OS_AXIS: 089
OD_SPHERE: -5.75
OS_VPRISM_DIRECTION: BF
OD_OVR_VA: 20/
OD_OVR_VA: 20/
OD_AXIS: 118
OS_OVR_VA: 20/
OD_OVR_VA: 20/
OS_AXIS: 098
OS_OVR_VA: 20/
OD_ADD: +2.50
OD_SPHERE: -5.50
OS_VPRISM_DIRECTION: PROGS
OD_CYLINDER: -1.00
OD_ADD: +2.75
OD_CYLINDER: -1.00
OS_SPHERE: -5.75
OS_ADD: +3.00
OS_CYLINDER: -0.75
OD_CYLINDER: -1.25
OS_CYLINDER: SPH
OD_AXIS: 128
OD_AXIS: 116
OD_CYLINDER: -0.25
OD_ADD: +2.50
OS_CYLINDER: -0.75
OD_VPRISM_DIRECTION: PROGS
OS_SPHERE: -5.75
OD_VPRISM_DIRECTION: PROGS

## 2025-05-22 ASSESSMENT — PACHYMETRY
OD_CT_UM: 560
OS_CT_CORRECTION: -1
OS_CT_UM: 560
OD_CT_CORRECTION: -1

## 2025-05-22 ASSESSMENT — VISUAL ACUITY
OD_BCVA: 20/40
OS_BCVA: 20/40

## 2025-05-22 ASSESSMENT — REFRACTION_MANIFEST
OU_VA: 20/NI
OS_SPHERE: +0.25
OS_CYLINDER: -0.50
OD_AXIS: 122
OS_VA1: 20/20
OD_CYLINDER: -0.50
OU_VA: 20/20
OS_AXIS: 074
OS_ADD: +3.00
OD_SPHERE: -5.75
OS_SPHERE: -5.25
OS_CYLINDER: -0.75
OD_VA1: 20/NI
OS_VA1: 20/NI
OS_AXIS: 120
OD_ADD: +3.00
OD_SPHERE: +0.25
OD_VA1: 20/20

## 2025-05-22 ASSESSMENT — REFRACTION_AUTOREFRACTION
OS_AXIS: 076
OD_AXIS: 113
OD_CYLINDER: -1.00
OD_SPHERE: +1.00
OS_SPHERE: +1.00
OS_CYLINDER: -1.75

## 2025-05-22 ASSESSMENT — CONFRONTATIONAL VISUAL FIELD TEST (CVF)
OS_FINDINGS: FULL
OD_FINDINGS: FULL

## 2025-05-22 ASSESSMENT — CORNEAL EDEMA - FOLDS/STRIAE: OD_FOLDSSTRIAE: ABSENT
